# Patient Record
Sex: MALE | Race: WHITE | NOT HISPANIC OR LATINO | URBAN - METROPOLITAN AREA
[De-identification: names, ages, dates, MRNs, and addresses within clinical notes are randomized per-mention and may not be internally consistent; named-entity substitution may affect disease eponyms.]

---

## 2018-03-15 ENCOUNTER — EMERGENCY (EMERGENCY)
Facility: HOSPITAL | Age: 33
LOS: 0 days | Discharge: HOME | End: 2018-03-15
Attending: EMERGENCY MEDICINE

## 2018-03-15 VITALS
OXYGEN SATURATION: 98 % | RESPIRATION RATE: 18 BRPM | TEMPERATURE: 96 F | HEART RATE: 63 BPM | DIASTOLIC BLOOD PRESSURE: 68 MMHG | SYSTOLIC BLOOD PRESSURE: 120 MMHG

## 2018-03-15 DIAGNOSIS — T20.57XA: ICD-10-CM
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
## 2018-03-15 DIAGNOSIS — T20.50XA: ICD-10-CM
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
## 2018-03-15 DIAGNOSIS — T31.0 BURNS INVOLVING LESS THAN 10% OF BODY SURFACE: ICD-10-CM

## 2018-03-15 RX ORDER — TETANUS TOXOID, REDUCED DIPHTHERIA TOXOID AND ACELLULAR PERTUSSIS VACCINE, ADSORBED 5; 2.5; 8; 8; 2.5 [IU]/.5ML; [IU]/.5ML; UG/.5ML; UG/.5ML; UG/.5ML
0.5 SUSPENSION INTRAMUSCULAR ONCE
Qty: 0 | Refills: 0 | Status: COMPLETED | OUTPATIENT
Start: 2018-03-15 | End: 2018-03-15

## 2018-03-15 RX ADMIN — TETANUS TOXOID, REDUCED DIPHTHERIA TOXOID AND ACELLULAR PERTUSSIS VACCINE, ADSORBED 0.5 MILLILITER(S): 5; 2.5; 8; 8; 2.5 SUSPENSION INTRAMUSCULAR at 19:45
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
## 2018-12-06 NOTE — ED ADULT NURSE NOTE - GASTROINTESTINAL ASSESSMENT
Telephone Encounter by Linda Bello RN at 09/15/17 03:52 PM     Author:  Linda Bello RN Service:  (none) Author Type:  Registered Nurse     Filed:  09/15/17 03:53 PM Encounter Date:  9/15/2017 Status:  Signed     :  Linda Bello RN (Registered Nurse)            Per Dr. Neville, check with Dr. Pierre's office to see what is needed on the US.     Left message for Dr. Pierre's office.   Do they need an US of all the organs in the abdomen or just liver, gallbladder and pancreas.   Also need new diagnosis code of all the organs need to be visualized.   Waiting for a call back.[MB1.1M]      Revision History        User Key Date/Time User Provider Type Action    > MB1.1 09/15/17 03:53 PM Linda Bello RN Registered Nurse Sign    M - Manual             WDL

## 2023-01-03 ENCOUNTER — EMERGENCY (EMERGENCY)
Facility: HOSPITAL | Age: 38
LOS: 0 days | Discharge: HOME | End: 2023-01-03
Attending: STUDENT IN AN ORGANIZED HEALTH CARE EDUCATION/TRAINING PROGRAM | Admitting: STUDENT IN AN ORGANIZED HEALTH CARE EDUCATION/TRAINING PROGRAM
Payer: OTHER MISCELLANEOUS

## 2023-01-03 VITALS
OXYGEN SATURATION: 99 % | WEIGHT: 149.91 LBS | RESPIRATION RATE: 16 BRPM | DIASTOLIC BLOOD PRESSURE: 64 MMHG | HEART RATE: 65 BPM | SYSTOLIC BLOOD PRESSURE: 123 MMHG | TEMPERATURE: 97 F
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
## 2023-01-03 DIAGNOSIS — M25.512 PAIN IN LEFT SHOULDER: ICD-10-CM
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
## 2023-01-03 PROCEDURE — 73030 X-RAY EXAM OF SHOULDER: CPT | Mod: 26,LT

## 2023-01-03 PROCEDURE — 99284 EMERGENCY DEPT VISIT MOD MDM: CPT

## 2023-01-03 NOTE — ED PROVIDER NOTE - NS ED ROS FT
Constitutional: No fever   Eyes:  No visual changes  Ears:  No hearing changes  Neck: No neck pain  Cardiac:  No chest pain  Respiratory:  No SOB   GI:  No abdominal pain, nausea, or vomiting  :  No dysuria  MS:  No back pain +shoulder pain  Neuro:  No headache or weakness.  No LOC  Skin:  No skin rash

## 2023-01-03 NOTE — ED PROVIDER NOTE - CARE PROVIDER_API CALL
Mike Chowdhury)  Orthopaedic Surgery  [address] [facility], [address] [place] [address] [place] [address]  Phone: [phone]  Fax: [phone]  Follow Up Time: 7-10 Days

## 2023-01-03 NOTE — ED PROVIDER NOTE - PATIENT PORTAL LINK FT
You can access the FollowMyHealth Patient Portal offered by [facility] by registering at the following website: [email or web address]. By joining [facility]’s FollowMyHealth portal, you will also be able to view your health information using other applications (apps) compatible with our system.

## 2023-01-03 NOTE — ED PROVIDER NOTE - ATTENDING CONTRIBUTION TO CARE
I personally evaluated the patient. I reviewed the Resident’s or Physician Assistant’s note (as assigned above), and agree with the findings and plan except as documented in my note.    SEE MDM.

## 2023-01-03 NOTE — ED PROVIDER NOTE - OBJECTIVE STATEMENT
37-year-old with no past medical history who presents with left shoulder pain he works as a  and today he was lifting heavy trash and he felt popping noise on his shoulder.  He has still been able to move it since the incident.  This occurred about 3 hours prior to arrival.  No history of any surgery or injuries to discharge in the past.  No numbness or weakness.

## 2023-01-03 NOTE — ED PROVIDER NOTE - PHYSICAL EXAMINATION
CONSTITUTIONAL: well-developed, well-nourished, in no acute distress  SKIN: warm, dry  HEAD: Normocephalic  EYES: no conjunctival erythema  ENT: no nasal discharge, airway clear  NECK: full ROM, non-tender  CARD: regular rate and rhythm  RESP: normal respiratory effort  EXT: moving all extremities spontaneously left shoulder: no focal tenderness full ROM no swelling erythema or warmth, +2 dp  NEURO: alert and oriented, grossly unremarkable  PSYCH: cooperative, appropriate

## 2023-01-03 NOTE — ED PROVIDER NOTE - CLINICAL SUMMARY MEDICAL DECISION MAKING FREE TEXT BOX
Patient with no relevant pmh presenting for evaluation of the left shoulder after hearing a "pop" while lifting something heavy. On exam, FROM noted to the left shoulder, pain elicited with abduction beyond 90 degrees, no bony tenderness. Neurovascularly intact LUE. Xray interpreted independently by me with no acute fractures noted. Symptoms likely soft tissue injury, possible rotator cuff tear, etc. Instructed to follow up with orthopedic surgery for further evaluation. return precautions discussed.